# Patient Record
Sex: FEMALE | Race: BLACK OR AFRICAN AMERICAN | NOT HISPANIC OR LATINO | URBAN - METROPOLITAN AREA
[De-identification: names, ages, dates, MRNs, and addresses within clinical notes are randomized per-mention and may not be internally consistent; named-entity substitution may affect disease eponyms.]

---

## 2021-06-10 ENCOUNTER — OUT OF OFFICE VISIT (OUTPATIENT)
Dept: URBAN - METROPOLITAN AREA MEDICAL CENTER 28 | Facility: MEDICAL CENTER | Age: 41
End: 2021-06-10
Payer: OTHER GOVERNMENT

## 2021-06-10 DIAGNOSIS — K52.89 (LYMPHOCYTIC) MICROSCOPIC COLITIS: ICD-10-CM

## 2021-06-10 DIAGNOSIS — K76.89 ABNORMAL FINDING ON LIVER FUNCTION: ICD-10-CM

## 2021-06-10 PROCEDURE — 99232 SBSQ HOSP IP/OBS MODERATE 35: CPT | Performed by: INTERNAL MEDICINE

## 2021-06-16 ENCOUNTER — TELEPHONE ENCOUNTER (OUTPATIENT)
Dept: URBAN - METROPOLITAN AREA CLINIC 23 | Facility: CLINIC | Age: 41
End: 2021-06-16

## 2021-06-16 ENCOUNTER — OFFICE VISIT (OUTPATIENT)
Dept: URBAN - METROPOLITAN AREA CLINIC 78 | Facility: CLINIC | Age: 41
End: 2021-06-16
Payer: OTHER GOVERNMENT

## 2021-06-16 VITALS
BODY MASS INDEX: 21.63 KG/M2 | TEMPERATURE: 98 F | HEART RATE: 99 BPM | WEIGHT: 129.8 LBS | SYSTOLIC BLOOD PRESSURE: 132 MMHG | DIASTOLIC BLOOD PRESSURE: 94 MMHG | HEIGHT: 65 IN

## 2021-06-16 DIAGNOSIS — K52.89 OTHER SPECIFIED NONINFECTIVE GASTROENTERITIS AND COLITIS: ICD-10-CM

## 2021-06-16 DIAGNOSIS — K59.09 CHRONIC CONSTIPATION: ICD-10-CM

## 2021-06-16 DIAGNOSIS — K58.1 IRRITABLE BOWEL SYNDROME WITH CONSTIPATION: ICD-10-CM

## 2021-06-16 PROCEDURE — G9903 PT SCRN TBCO ID AS NON USER: HCPCS | Performed by: INTERNAL MEDICINE

## 2021-06-16 PROCEDURE — 1036F TOBACCO NON-USER: CPT | Performed by: INTERNAL MEDICINE

## 2021-06-16 PROCEDURE — G8420 CALC BMI NORM PARAMETERS: HCPCS | Performed by: INTERNAL MEDICINE

## 2021-06-16 PROCEDURE — G9622 NO UNHEAL ETOH USER: HCPCS | Performed by: INTERNAL MEDICINE

## 2021-06-16 PROCEDURE — 3017F COLORECTAL CA SCREEN DOC REV: CPT | Performed by: INTERNAL MEDICINE

## 2021-06-16 PROCEDURE — G8427 DOCREV CUR MEDS BY ELIG CLIN: HCPCS | Performed by: INTERNAL MEDICINE

## 2021-06-16 PROCEDURE — 99214 OFFICE O/P EST MOD 30 MIN: CPT | Performed by: INTERNAL MEDICINE

## 2021-06-16 RX ORDER — SODIUM, POTASSIUM,MAG SULFATES 17.5-3.13G
17.5-13.3-1.6 GM/177ML SOLUTION, RECONSTITUTED, ORAL ORAL AS DIRECTED
Qty: 354 MILLILITER | OUTPATIENT
Start: 2021-06-16 | End: 2021-06-17

## 2021-06-16 RX ORDER — PROMETHAZINE HYDROCHLORIDE 12.5 MG/1
1 TABLET AS NEEDED TABLET ORAL
Status: ACTIVE | COMMUNITY

## 2021-06-16 RX ORDER — DICYCLOMINE HYDROCHLORIDE 10 MG/1
TAKE 1 CAPSULE (10 MG) BY ORAL ROUTE 3 TIMES PER DAY AS NEEDED CAPSULE ORAL
Qty: 90 | Refills: 11 | Status: ACTIVE | COMMUNITY
Start: 2018-01-30

## 2021-06-16 RX ORDER — DICYCLOMINE HYDROCHLORIDE 20 MG/1
1 TABLET TABLET ORAL THREE TIMES A DAY
Qty: 90 | Refills: 1 | OUTPATIENT
Start: 2021-06-16 | End: 2021-08-15

## 2021-06-16 NOTE — PREVIOUS WORKUP REVIEWED
.IMAGES -MRI liver without and with contrast and MRCP 6/9/2021: Focal nodular hyperplasia x2. Recommend follow-up MRI in 6 months with Eovist.-CT abdomen and pelvis with contrast 6/7/2021: Possible descending and sigmoid colitis. 2 x 2 cm liver lesion in the segment 2, hypervascular. Another 3 mm hypervascular lesion in the segment 2.-CT abdomen pelvis with IV contrast 4/3/2019: Stable 2 cm liver lesion, hyperenhancement in the left hepatic lobe. Mild periportal edema. Mild gallbladder wall thickening without stones. Normal pancreas. Normal spleen. Mild thickening of the gastric wall. Under distended colon with some mild wall thickening. Distal colonic diverticulosis.-MRI abdomen with and without contrast 1/23/2018: Benign-appearing area for focal nodular hyperplasia within segment 2. Additional probable small area of focal nodular hyperplasia within the left hepatic lobe.-CT abdomen pelvis with contrast 1/8/2018: Equivocal mild thickening of the colon and possibility of mild colitis. Mild gallbladder wall thickening and periportal edema. 2 cm enhancing lesion in the left lobe of the liver. Possible 1 cm lesion in the right lobe of the liver. LABS -Labs 6/7/2021: WBC 14.5, hemoglobin 14.3, platelet 227. Sodium 140, potassium 3.8, BUN 17, creatinine 0.8, total protein 8.0, albumin 4.3, total bilirubin 1.1, alkaline phosphatase 79, AST 19, ALT 13. Lipase 73.-Labs 6/10/2021: WBC 7.9, hemoglobin 14.9, platelets 235. BUN 8, creatinine 0.8. GPP negative, C. difficile negative.-Labs 4/3/2019: BUN 13, creatinine 0.69, total protein 8.3, albumin 4.8, total bilirubin 1.4, alkaline phosphatase 69, ALT 11, AST 15, lipase 23, WBC 11, hemoglobin 13.7, platelet 213.-Labs 1/18/2018: Total bilirubin 0.6, direct bilirubin 0.1, alkaline phosphatase 75, AST 15, ALT 10, no evidence of hemolysis.

## 2021-06-16 NOTE — HPI-TODAY'S VISIT:
41-year-old -American female with chronic constipation on Linzess 145 mcg, presents for follow-up of hospital stay for possible colitis.  She had acute onset nausea vomiting migrating abdominal pain and diarrhea about 2 weeks ago.  She went to the hospital and hospitalized for few days.  CT scan showed possible colitis in the left-sided colon.  She reports that she had worsening constipation before this happened.  She would move bowel every other day with Linzess 145 mcg.  She had colonoscopy done 5 years ago.  No family history of colon cancer or IBD.  Currently she feels better, no nausea or vomiting.  Residual abdominal pain.  No bowel movements for 3-4 days then yesterday she had 5 bowel movements, loose stools.  She took antibiotic takes Levaquin and Flagyl finished 3 days ago.  Antibiotic helped a lot.

## 2021-06-18 ENCOUNTER — OFFICE VISIT (OUTPATIENT)
Dept: URBAN - METROPOLITAN AREA SURGERY CENTER 15 | Facility: SURGERY CENTER | Age: 41
End: 2021-06-18
Payer: OTHER GOVERNMENT

## 2021-06-18 DIAGNOSIS — K63.89 BACTERIAL OVERGROWTH SYNDROME: ICD-10-CM

## 2021-06-18 DIAGNOSIS — D12.5 ADENOMA OF SIGMOID COLON: ICD-10-CM

## 2021-06-18 DIAGNOSIS — R93.3 ABN FINDINGS-GI TRACT: ICD-10-CM

## 2021-06-18 PROCEDURE — G8907 PT DOC NO EVENTS ON DISCHARG: HCPCS | Performed by: INTERNAL MEDICINE

## 2021-06-18 PROCEDURE — 45385 COLONOSCOPY W/LESION REMOVAL: CPT | Performed by: INTERNAL MEDICINE

## 2021-06-18 PROCEDURE — 45380 COLONOSCOPY AND BIOPSY: CPT | Performed by: INTERNAL MEDICINE

## 2021-06-18 RX ORDER — DICYCLOMINE HYDROCHLORIDE 10 MG/1
TAKE 1 CAPSULE (10 MG) BY ORAL ROUTE 3 TIMES PER DAY AS NEEDED CAPSULE ORAL
Qty: 90 | Refills: 11 | Status: ACTIVE | COMMUNITY
Start: 2018-01-30

## 2021-06-18 RX ORDER — PROMETHAZINE HYDROCHLORIDE 12.5 MG/1
1 TABLET AS NEEDED TABLET ORAL
Status: ACTIVE | COMMUNITY

## 2021-06-18 RX ORDER — DICYCLOMINE HYDROCHLORIDE 20 MG/1
1 TABLET TABLET ORAL THREE TIMES A DAY
Qty: 90 | Refills: 1 | Status: ACTIVE | COMMUNITY
Start: 2021-06-16 | End: 2021-08-15

## 2021-07-29 ENCOUNTER — OFFICE VISIT (OUTPATIENT)
Dept: URBAN - METROPOLITAN AREA SURGERY CENTER 15 | Facility: SURGERY CENTER | Age: 41
End: 2021-07-29

## 2021-08-03 ENCOUNTER — OFFICE VISIT (OUTPATIENT)
Dept: URBAN - METROPOLITAN AREA CLINIC 78 | Facility: CLINIC | Age: 41
End: 2021-08-03

## 2021-12-09 ENCOUNTER — OFFICE VISIT (OUTPATIENT)
Dept: URBAN - METROPOLITAN AREA CLINIC 78 | Facility: CLINIC | Age: 41
End: 2021-12-09

## 2021-12-09 RX ORDER — DICYCLOMINE HYDROCHLORIDE 10 MG/1
TAKE 1 CAPSULE (10 MG) BY ORAL ROUTE 3 TIMES PER DAY AS NEEDED CAPSULE ORAL
Qty: 90 | Refills: 11 | Status: ACTIVE | COMMUNITY
Start: 2018-01-30

## 2021-12-09 RX ORDER — PROMETHAZINE HYDROCHLORIDE 12.5 MG/1
1 TABLET AS NEEDED TABLET ORAL
Status: ACTIVE | COMMUNITY

## 2022-01-27 ENCOUNTER — OFFICE VISIT (OUTPATIENT)
Dept: URBAN - METROPOLITAN AREA CLINIC 78 | Facility: CLINIC | Age: 42
End: 2022-01-27
Payer: OTHER GOVERNMENT

## 2022-01-27 VITALS
HEART RATE: 106 BPM | BODY MASS INDEX: 22.89 KG/M2 | HEIGHT: 65 IN | WEIGHT: 137.4 LBS | SYSTOLIC BLOOD PRESSURE: 127 MMHG | TEMPERATURE: 98.1 F | DIASTOLIC BLOOD PRESSURE: 86 MMHG

## 2022-01-27 DIAGNOSIS — K58.1 IRRITABLE BOWEL SYNDROME WITH CONSTIPATION: ICD-10-CM

## 2022-01-27 DIAGNOSIS — K76.89 FOCAL NODULAR HYPERPLASIA OF LIVER: ICD-10-CM

## 2022-01-27 PROBLEM — 440630006: Status: ACTIVE | Noted: 2021-06-16

## 2022-01-27 PROBLEM — 278527001: Status: ACTIVE | Noted: 2022-01-27

## 2022-01-27 PROCEDURE — G9622 NO UNHEAL ETOH USER: HCPCS | Performed by: INTERNAL MEDICINE

## 2022-01-27 PROCEDURE — G9903 PT SCRN TBCO ID AS NON USER: HCPCS | Performed by: INTERNAL MEDICINE

## 2022-01-27 PROCEDURE — G8420 CALC BMI NORM PARAMETERS: HCPCS | Performed by: INTERNAL MEDICINE

## 2022-01-27 PROCEDURE — 3017F COLORECTAL CA SCREEN DOC REV: CPT | Performed by: INTERNAL MEDICINE

## 2022-01-27 PROCEDURE — 99214 OFFICE O/P EST MOD 30 MIN: CPT | Performed by: INTERNAL MEDICINE

## 2022-01-27 PROCEDURE — 1036F TOBACCO NON-USER: CPT | Performed by: INTERNAL MEDICINE

## 2022-01-27 PROCEDURE — G8427 DOCREV CUR MEDS BY ELIG CLIN: HCPCS | Performed by: INTERNAL MEDICINE

## 2022-01-27 RX ORDER — LINACLOTIDE 145 UG/1
1 CAPSULE AT LEAST 30 MINUTES BEFORE THE FIRST MEAL OF THE DAY ON AN EMPTY STOMACH CAPSULE, GELATIN COATED ORAL ONCE A DAY
Qty: 90 | Refills: 3 | OUTPATIENT
Start: 2022-01-27 | End: 2023-01-22

## 2022-01-27 RX ORDER — PROMETHAZINE HYDROCHLORIDE 12.5 MG/1
1 TABLET AS NEEDED TABLET ORAL
Status: ACTIVE | COMMUNITY

## 2022-01-27 RX ORDER — DICYCLOMINE HYDROCHLORIDE 10 MG/1
TAKE 1 CAPSULE (10 MG) BY ORAL ROUTE 3 TIMES PER DAY AS NEEDED CAPSULE ORAL
Qty: 90 | Refills: 11 | Status: ACTIVE | COMMUNITY
Start: 2018-01-30

## 2022-01-27 NOTE — HPI-TODAY'S VISIT:
41-year-old -American female presents for follow-up of IBS-C.  She takes Linzess 145 mcg daily.  Align probiotics.  She moves bowel daily or every other day.  When she has no bowel movement for >2 days, she starts having abdominal pain, cramping, nausea. She tried Linzess 290 mcg in the past, caused the diarrhea.

## 2022-01-27 NOTE — PREVIOUS WORKUP REVIEWED
REVIEWED EXTERNAL MEDICAL RECORD  ENDOSCOPIES  -Colonoscopy 6/18/2021: Normal TI.  Moderately redundant colon.  A 7 mm polyp in the sigmoid colon.  Hemorrhoids. *Pathology: Random colon-normal, several lymphoid aggregates.  Sigmoid colon polyp-serrated adenoma. IMAGES  -MRI liver without and with contrast and MRCP 6/9/2021: Focal nodular hyperplasia x2. 1.8cm and 0.8cm.-CT abdomen and pelvis with contrast 6/7/2021: Possible descending and sigmoid colitis. 2 x 2 cm liver lesion in the segment 2, hypervascular. Another 3 mm hypervascular lesion in the segment 2.-CT abdomen pelvis with IV contrast 4/3/2019: Stable 2 cm liver lesion, hyperenhancement in the left hepatic lobe. Mild periportal edema. Mild gallbladder wall thickening without stones. Normal pancreas. Normal spleen. Mild thickening of the gastric wall. Under distended colon with some mild wall thickening. Distal colonic diverticulosis.-MRI abdomen with and without contrast 1/23/2018: Benign-appearing area for focal nodular hyperplasia within segment 2. Additional probable small area of focal nodular hyperplasia within the left hepatic lobe. (1.6 cm, 0.5 cm)-CT abdomen pelvis with contrast 1/8/2018: Equivocal mild thickening of the colon and possibility of mild colitis. Mild gallbladder wall thickening and periportal edema. 2 cm enhancing lesion in the left lobe of the liver. Possible 1 cm lesion in the right lobe of the liver. LABS -Labs 6/7/2021: WBC 14.5, hemoglobin 14.3, platelet 227. Sodium 140, potassium 3.8, BUN 17, creatinine 0.8, total protein 8.0, albumin 4.3, total bilirubin 1.1, alkaline phosphatase 79, AST 19, ALT 13. Lipase 73.-Labs 6/10/2021: WBC 7.9, hemoglobin 14.9, platelets 235. BUN 8, creatinine 0.8. GPP negative, C. difficile negative.-Labs 4/3/2019: BUN 13, creatinine 0.69, total protein 8.3, albumin 4.8, total bilirubin 1.4, alkaline phosphatase 69, ALT 11, AST 15, lipase 23, WBC 11, hemoglobin 13.7, platelet 213.-Labs 1/18/2018: Total bilirubin 0.6, direct bilirubin 0.1, alkaline phosphatase 75, AST 15, ALT 10, no evidence of hemolysis.

## 2022-06-09 ENCOUNTER — OFFICE VISIT (OUTPATIENT)
Dept: URBAN - METROPOLITAN AREA CLINIC 78 | Facility: CLINIC | Age: 42
End: 2022-06-09
Payer: OTHER GOVERNMENT

## 2022-06-09 ENCOUNTER — LAB OUTSIDE AN ENCOUNTER (OUTPATIENT)
Dept: URBAN - METROPOLITAN AREA CLINIC 78 | Facility: CLINIC | Age: 42
End: 2022-06-09

## 2022-06-09 DIAGNOSIS — K58.9 IRRITABLE BOWEL SYNDROME, UNSPECIFIED TYPE: ICD-10-CM

## 2022-06-09 DIAGNOSIS — K82.8 GALLBLADDER SLUDGE: ICD-10-CM

## 2022-06-09 DIAGNOSIS — R11.2 NAUSEA AND VOMITING, UNSPECIFIED VOMITING TYPE: ICD-10-CM

## 2022-06-09 DIAGNOSIS — R10.84 ABDOMINAL CRAMPING, GENERALIZED: ICD-10-CM

## 2022-06-09 PROCEDURE — G9622 NO UNHEAL ETOH USER: HCPCS | Performed by: INTERNAL MEDICINE

## 2022-06-09 PROCEDURE — 1036F TOBACCO NON-USER: CPT | Performed by: INTERNAL MEDICINE

## 2022-06-09 PROCEDURE — G8420 CALC BMI NORM PARAMETERS: HCPCS | Performed by: INTERNAL MEDICINE

## 2022-06-09 PROCEDURE — G9903 PT SCRN TBCO ID AS NON USER: HCPCS | Performed by: INTERNAL MEDICINE

## 2022-06-09 PROCEDURE — G8427 DOCREV CUR MEDS BY ELIG CLIN: HCPCS | Performed by: INTERNAL MEDICINE

## 2022-06-09 PROCEDURE — 3017F COLORECTAL CA SCREEN DOC REV: CPT | Performed by: INTERNAL MEDICINE

## 2022-06-09 PROCEDURE — 99214 OFFICE O/P EST MOD 30 MIN: CPT | Performed by: INTERNAL MEDICINE

## 2022-06-09 RX ORDER — AMITRIPTYLINE HYDROCHLORIDE 10 MG/1
1 TABLET AT BEDTIME TABLET, FILM COATED ORAL ONCE A DAY
Qty: 30 | Refills: 2 | OUTPATIENT
Start: 2022-06-09

## 2022-06-09 RX ORDER — DICYCLOMINE HYDROCHLORIDE 10 MG/1
TAKE 1 CAPSULE (10 MG) BY ORAL ROUTE 3 TIMES PER DAY AS NEEDED CAPSULE ORAL
Qty: 90 | Refills: 11 | Status: ACTIVE | COMMUNITY
Start: 2018-01-30

## 2022-06-09 RX ORDER — LINACLOTIDE 145 UG/1
1 CAPSULE AT LEAST 30 MINUTES BEFORE THE FIRST MEAL OF THE DAY ON AN EMPTY STOMACH CAPSULE, GELATIN COATED ORAL ONCE A DAY
Qty: 90 | Refills: 3 | Status: ACTIVE | COMMUNITY
Start: 2022-01-27 | End: 2023-01-22

## 2022-06-09 NOTE — PREVIOUS WORKUP REVIEWED
REVIEWED EXTERNAL MEDICAL RECORDENDOSCOPIES-Colonoscopy 6/18/2021: Normal TI. Moderately redundant colon. A 7 mm polyp in the sigmoid colon. Hemorrhoids.*Pathology: Random colon-normal, several lymphoid aggregates. Sigmoid colon polyp-serrated adenoma.IMAGES -CT abdomen pelvis with contrast 4/7/2022:Mild colonic wall thickening, equivocal. Focal nodular hyperplasia in the liver, 2.4 cm.-MRI liver without and with contrast and MRCP 6/9/2021: Focal nodular hyperplasia x2. 1.8cm and 0.8cm.-CT abdomen and pelvis with contrast 6/7/2021: Possible descending and sigmoid colitis. 2 x 2 cm liver lesion in the segment 2, hypervascular. Another 3 mm hypervascular lesion in the segment 2.-CT abdomen pelvis with IV contrast 4/3/2019: Stable 2 cm liver lesion, hyperenhancement in the left hepatic lobe. Mild periportal edema. Mild gallbladder wall thickening without stones. Normal pancreas. Normal spleen. Mild thickening of the gastric wall. Under distended colon with some mild wall thickening. Distal colonic diverticulosis.-MRI abdomen with and without contrast 1/23/2018: Benign-appearing area for focal nodular hyperplasia within segment 2. Additional probable small area of focal nodular hyperplasia within the left hepatic lobe. (1.6 cm, 0.5 cm)-CT abdomen pelvis with contrast 1/8/2018: Equivocal mild thickening of the colon and possibility of mild colitis. Mild gallbladder wall thickening and periportal edema. 2 cm enhancing lesion in the left lobe of the liver. Possible 1 cm lesion in the right lobe of the liver. LABS -Labs 4/10/2022:WBC 7.8, hemoglobin 17.6, platelet 246. Sodium 133, potassium 3.2, glucose 111, BUN 20, creatinine 0.8, calcium 9.5, total protein 7.6, albumin 4.1, total bilirubin 2.0 H, alkaline phosphatase 58, AST 16, ALT 14, lipase 19.-Labs 4/7/2022:WBC 11.7, hemoglobin 15.9, platelet 239. BUN 23, creatinine 0.8, total bilirubin 1.0, alkaline phosphatase 60, AST 12, ALT 14, lipase 29.-Labs 6/7/2021: WBC 14.5, hemoglobin 14.3, platelet 227. Sodium 140, potassium 3.8, BUN 17, creatinine 0.8, total protein 8.0, albumin 4.3, total bilirubin 1.1, alkaline phosphatase 79, AST 19, ALT 13. Lipase 73.-Labs 6/10/2021: WBC 7.9, hemoglobin 14.9, platelets 235. BUN 8, creatinine 0.8. GPP negative, C. difficile negative.-Labs 4/3/2019: BUN 13, creatinine 0.69, total protein 8.3, albumin 4.8, total bilirubin 1.4, alkaline phosphatase 69, ALT 11, AST 15, lipase 23, WBC 11, hemoglobin 13.7, platelet 213.-Labs 1/18/2018: Total bilirubin 0.6, direct bilirubin 0.1, alkaline phosphatase 75, AST 15, ALT 10, no evidence of hemolysis. , -

## 2022-06-09 NOTE — HPI-TODAY'S VISIT:
42-year-old female with IBS-C presents for follow-up of multiple ER visits for nausea vomiting dehydration abdominal pain.  Abdomen pain is left-sided and lower abdomen.  Intermittent.  Postprandial.  Denies blood in stool.  Weight loss of 5-8 pounds.  ER visit x3, [facility] x2 and [facility].  Unremarkable labs and CT scan.  Currently she is feeling better but she gets symptomatic once a week.  She is on pantoprazole, prescribed in the ER.  EGD long time ago unremarkable.  She takes dicyclomine as needed, almost every day. IBS-C, she takes Linzess 145 mcg.  Moves bowel daily, [place] Stool Scale 7.  Without the medication she would not move bowel at all. She is following OB/GYN for abdominal pain.  Painful menstruation.  Possibility of endometriosis was considered but less likely per patient's report.

## 2022-06-09 NOTE — PHYSICAL EXAM CONSTITUTIONAL:
well developed, well nourished , in no acute distress , ambulating without difficulty , normal communication ability , -

## 2022-07-06 ENCOUNTER — TELEPHONE ENCOUNTER (OUTPATIENT)
Dept: URBAN - METROPOLITAN AREA CLINIC 78 | Facility: CLINIC | Age: 42
End: 2022-07-06

## 2022-07-12 ENCOUNTER — OFFICE VISIT (OUTPATIENT)
Dept: URBAN - METROPOLITAN AREA CLINIC 78 | Facility: CLINIC | Age: 42
End: 2022-07-12
Payer: OTHER GOVERNMENT

## 2022-07-12 VITALS
SYSTOLIC BLOOD PRESSURE: 177 MMHG | WEIGHT: 129.8 LBS | HEIGHT: 65 IN | TEMPERATURE: 98 F | HEART RATE: 78 BPM | DIASTOLIC BLOOD PRESSURE: 125 MMHG | BODY MASS INDEX: 21.63 KG/M2

## 2022-07-12 DIAGNOSIS — R11.2 NAUSEA AND VOMITING, UNSPECIFIED VOMITING TYPE: ICD-10-CM

## 2022-07-12 DIAGNOSIS — K58.9 IRRITABLE BOWEL SYNDROME, UNSPECIFIED TYPE: ICD-10-CM

## 2022-07-12 PROBLEM — 10743008: Status: ACTIVE | Noted: 2022-06-09

## 2022-07-12 PROCEDURE — 99213 OFFICE O/P EST LOW 20 MIN: CPT | Performed by: INTERNAL MEDICINE

## 2022-07-12 RX ORDER — LINACLOTIDE 145 UG/1
1 CAPSULE AT LEAST 30 MINUTES BEFORE THE FIRST MEAL OF THE DAY ON AN EMPTY STOMACH CAPSULE, GELATIN COATED ORAL ONCE A DAY
Qty: 90 | Refills: 3 | Status: ACTIVE | COMMUNITY
Start: 2022-01-27 | End: 2023-01-22

## 2022-07-12 RX ORDER — DICYCLOMINE HYDROCHLORIDE 10 MG/1
TAKE 1 CAPSULE (10 MG) BY ORAL ROUTE 3 TIMES PER DAY AS NEEDED CAPSULE ORAL
Qty: 90 | Refills: 11 | Status: ACTIVE | COMMUNITY
Start: 2018-01-30

## 2022-07-12 RX ORDER — AMITRIPTYLINE HYDROCHLORIDE 10 MG/1
1 TABLET AT BEDTIME TABLET, FILM COATED ORAL ONCE A DAY
Qty: 30 | Refills: 2 | Status: ACTIVE | COMMUNITY
Start: 2022-06-09

## 2022-07-12 RX ORDER — NORETHINDRONE ACETATE 5 MG/1
TABLET ORAL
Qty: 60 TABLET | Status: ACTIVE | COMMUNITY

## 2022-07-12 RX ORDER — PANTOPRAZOLE SODIUM 40 MG/1
1 TABLET TABLET, DELAYED RELEASE ORAL ONCE A DAY
Qty: 90 TABLET | Refills: 3

## 2022-07-12 RX ORDER — ONDANSETRON 4 MG/1
TABLET, ORALLY DISINTEGRATING ORAL
Qty: 12 EACH | Status: ACTIVE | COMMUNITY

## 2022-07-12 NOTE — PREVIOUS WORKUP REVIEWED
REVIEWED EXTERNAL MEDICAL RECORDENDOSCOPIES-Colonoscopy 6/18/2021: Normal TI. Moderately redundant colon. A 7 mm polyp in the sigmoid colon. Hemorrhoids.*Pathology: Random colon-normal, several lymphoid aggregates. Sigmoid colon polyp-serrated adenoma.IMAGES-HIDA scan with CCK 6/30/2022: Normal.  Ejection fraction 87 %.-RUQ US 4/11/2022: No gallstone.  CBD < 6 mm.  Visualized portion of pancreas normal.  Normal liver.  No focal liver lesion.-CT abdomen pelvis with contrast 4/7/2022:Mild colonic wall thickening, equivocal. Focal nodular hyperplasia in the liver, 2.4 cm.-MRI liver without and with contrast and MRCP 6/9/2021: Focal nodular hyperplasia x2. 1.8cm and 0.8cm.-CT abdomen and pelvis with contrast 6/7/2021: Possible descending and sigmoid colitis. 2 x 2 cm liver lesion in the segment 2, hypervascular. Another 3 mm hypervascular lesion in the segment 2.-CT abdomen pelvis with IV contrast 4/3/2019: Stable 2 cm liver lesion, hyperenhancement in the left hepatic lobe. Mild periportal edema. Mild gallbladder wall thickening without stones. Normal pancreas. Normal spleen. Mild thickening of the gastric wall. Under distended colon with some mild wall thickening. Distal colonic diverticulosis.-MRI abdomen with and without contrast 1/23/2018: Benign-appearing area for focal nodular hyperplasia within segment 2. Additional probable small area of focal nodular hyperplasia within the left hepatic lobe. (1.6 cm, 0.5 cm)-CT abdomen pelvis with contrast 1/8/2018: Equivocal mild thickening of the colon and possibility of mild colitis. Mild gallbladder wall thickening and periportal edema. 2 cm enhancing lesion in the left lobe of the liver. Possible 1 cm lesion in the right lobe of the liver. LABS -Labs 4/10/2022:WBC 7.8, hemoglobin 17.6, platelet 246. Sodium 133, potassium 3.2, glucose 111, BUN 20, creatinine 0.8, calcium 9.5, total protein 7.6, albumin 4.1, total bilirubin 2.0 H, alkaline phosphatase 58, AST 16, ALT 14, lipase 19.-Labs 4/7/2022:WBC 11.7, hemoglobin 15.9, platelet 239. BUN 23, creatinine 0.8, total bilirubin 1.0, alkaline phosphatase 60, AST 12, ALT 14, lipase 29.-Labs 6/7/2021: WBC 14.5, hemoglobin 14.3, platelet 227. Sodium 140, potassium 3.8, BUN 17, creatinine 0.8, total protein 8.0, albumin 4.3, total bilirubin 1.1, alkaline phosphatase 79, AST 19, ALT 13. Lipase 73.-Labs 6/10/2021: WBC 7.9, hemoglobin 14.9, platelets 235. BUN 8, creatinine 0.8. GPP negative, C. difficile negative.-Labs 4/3/2019: BUN 13, creatinine 0.69, total protein 8.3, albumin 4.8, total bilirubin 1.4, alkaline phosphatase 69, ALT 11, AST 15, lipase 23, WBC 11, hemoglobin 13.7, platelet 213.-Labs 1/18/2018: Total bilirubin 0.6, direct bilirubin 0.1, alkaline phosphatase 75, AST 15, ALT 10, no evidence of hemolysis. , -

## 2022-07-12 NOTE — HPI-TODAY'S VISIT:
42-year-old female with IBS, chronic left lower quadrant pain presents for follow-up.  Last visit a month ago, amitriptyline 10 mg was prescribed.  HIDA scan with CCK ordered and came back negative.  The medication helps a lot.  No abdominal pain anymore.  Moves bowel better with it.  She forgot to take pantoprazole for the past 2 weeks.  She had multiple vomitings a few days ago.

## 2022-07-22 ENCOUNTER — TELEPHONE ENCOUNTER (OUTPATIENT)
Dept: URBAN - METROPOLITAN AREA CLINIC 78 | Facility: CLINIC | Age: 42
End: 2022-07-22

## 2022-08-05 ENCOUNTER — TELEPHONE ENCOUNTER (OUTPATIENT)
Dept: URBAN - METROPOLITAN AREA CLINIC 77 | Facility: CLINIC | Age: 42
End: 2022-08-05

## 2022-08-05 RX ORDER — AMITRIPTYLINE HYDROCHLORIDE 25 MG/1
1 TABLET AT BEDTIME TABLET, FILM COATED ORAL ONCE A DAY
Qty: 90 TABLET | Refills: 3
Start: 2022-06-09

## 2022-08-25 ENCOUNTER — OFFICE VISIT (OUTPATIENT)
Dept: URBAN - METROPOLITAN AREA CLINIC 78 | Facility: CLINIC | Age: 42
End: 2022-08-25

## 2023-01-25 ENCOUNTER — OFFICE VISIT (OUTPATIENT)
Dept: URBAN - METROPOLITAN AREA CLINIC 78 | Facility: CLINIC | Age: 43
End: 2023-01-25

## 2023-08-10 ENCOUNTER — TELEPHONE ENCOUNTER (OUTPATIENT)
Dept: URBAN - METROPOLITAN AREA CLINIC 113 | Facility: CLINIC | Age: 43
End: 2023-08-10

## 2023-08-10 RX ORDER — PANTOPRAZOLE SODIUM 40 MG/1
1 TABLET TABLET, DELAYED RELEASE ORAL ONCE A DAY
Qty: 90 TABLET | Refills: 3

## 2023-09-13 ENCOUNTER — OFFICE VISIT (OUTPATIENT)
Dept: URBAN - METROPOLITAN AREA CLINIC 78 | Facility: CLINIC | Age: 43
End: 2023-09-13
Payer: OTHER GOVERNMENT

## 2023-09-13 ENCOUNTER — DASHBOARD ENCOUNTERS (OUTPATIENT)
Age: 43
End: 2023-09-13

## 2023-09-13 VITALS
HEIGHT: 65 IN | WEIGHT: 163 LBS | HEART RATE: 80 BPM | SYSTOLIC BLOOD PRESSURE: 131 MMHG | BODY MASS INDEX: 27.16 KG/M2 | TEMPERATURE: 98.1 F | DIASTOLIC BLOOD PRESSURE: 85 MMHG

## 2023-09-13 DIAGNOSIS — R10.30 LOWER ABDOMINAL PAIN: ICD-10-CM

## 2023-09-13 DIAGNOSIS — K58.1 IRRITABLE BOWEL SYNDROME WITH CONSTIPATION: ICD-10-CM

## 2023-09-13 DIAGNOSIS — K21.9 CHRONIC GERD: ICD-10-CM

## 2023-09-13 PROBLEM — 235595009: Status: ACTIVE | Noted: 2023-09-13

## 2023-09-13 PROCEDURE — 99214 OFFICE O/P EST MOD 30 MIN: CPT | Performed by: INTERNAL MEDICINE

## 2023-09-13 RX ORDER — PANTOPRAZOLE SODIUM 40 MG/1
1 TABLET TABLET, DELAYED RELEASE ORAL ONCE A DAY
Qty: 90 TABLET | Refills: 3

## 2023-09-13 RX ORDER — ONDANSETRON 4 MG/1
TABLET, ORALLY DISINTEGRATING ORAL
Qty: 12 EACH | Status: ACTIVE | COMMUNITY

## 2023-09-13 RX ORDER — AMITRIPTYLINE HYDROCHLORIDE 25 MG/1
1 TABLET AT BEDTIME TABLET, FILM COATED ORAL ONCE A DAY
Qty: 90 TABLET | Refills: 3
Start: 2022-06-09

## 2023-09-13 RX ORDER — DICYCLOMINE HYDROCHLORIDE 10 MG/1
TAKE 1 CAPSULE (10 MG) BY ORAL ROUTE 3 TIMES PER DAY AS NEEDED CAPSULE ORAL
Qty: 90 | Refills: 11 | Status: ACTIVE | COMMUNITY
Start: 2018-01-30

## 2023-09-13 RX ORDER — NORETHINDRONE ACETATE 5 MG/1
TABLET ORAL
Qty: 60 TABLET | Status: ACTIVE | COMMUNITY

## 2023-09-13 RX ORDER — DICYCLOMINE HYDROCHLORIDE 20 MG/1
1 TABLET TABLET ORAL
Qty: 90 | Refills: 3 | OUTPATIENT
Start: 2023-09-13 | End: 2024-01-10

## 2023-09-13 RX ORDER — PANTOPRAZOLE SODIUM 40 MG/1
1 TABLET TABLET, DELAYED RELEASE ORAL ONCE A DAY
Qty: 90 TABLET | Refills: 3 | Status: ACTIVE | COMMUNITY

## 2023-09-13 RX ORDER — AMITRIPTYLINE HYDROCHLORIDE 25 MG/1
1 TABLET AT BEDTIME TABLET, FILM COATED ORAL ONCE A DAY
Qty: 90 TABLET | Refills: 3 | Status: ACTIVE | COMMUNITY
Start: 2022-06-09

## 2023-09-13 NOTE — HPI-TODAY'S VISIT:
43-year-old female presents for 4 months history of flareup of IBS.  Constipated. She moves bowel 3-4 times a week.  [place] Stool Scale type I-II.  Sharp pain in the lower abdomen.  Short burst. Amitriptyline helped with her IBS symptoms.  She ran out of the medication requesting refill. She also requesting pantoprazole for her chronic GERD. Currently she is not on laxatives. She is asking for FMLA.

## 2023-09-13 NOTE — PREVIOUS WORKUP REVIEWED
REVIEWED EXTERNAL MEDICAL RECORDENDOSCOPIES-Colonoscopy 6/18/2021: Normal TI. Moderately redundant colon. A 7 mm polyp in the sigmoid colon. Hemorrhoids.*Pathology: Random colon-normal, several lymphoid aggregates. Sigmoid colon polyp-serrated adenoma.IMAGES-HIDA scan with CCK 6/30/2022: Normal.  Ejection fraction 87 %.-RUQ US 4/11/2022: No gallstone.  CBD < 6 mm.  Visualized portion of pancreas normal.  Normal liver.  No focal liver lesion.-CT abdomen pelvis with contrast 4/7/2022:Mild colonic wall thickening, equivocal. Focal nodular hyperplasia in the liver, 2.4 cm.-MRI liver without and with contrast and MRCP 6/9/2021: Focal nodular hyperplasia x2. 1.8cm and 0.8cm.-CT abdomen and pelvis with contrast 6/7/2021: Possible descending and sigmoid colitis. 2 x 2 cm liver lesion in the segment 2, hypervascular. Another 3 mm hypervascular lesion in the segment 2.-CT abdomen pelvis with IV contrast 4/3/2019: Stable 2 cm liver lesion, hyperenhancement in the left hepatic lobe. Mild periportal edema. Mild gallbladder wall thickening without stones. Normal pancreas. Normal spleen. Mild thickening of the gastric wall. Under distended colon with some mild wall thickening. Distal colonic diverticulosis.-MRI abdomen with and without contrast 1/23/2018: Benign-appearing area for focal nodular hyperplasia within segment 2. Additional probable small area of focal nodular hyperplasia within the left hepatic lobe. (1.6 cm, 0.5 cm)-CT abdomen pelvis with contrast 1/8/2018: Equivocal mild thickening of the colon and possibility of mild colitis. Mild gallbladder wall thickening and periportal edema. 2 cm enhancing lesion in the left lobe of the liver. Possible 1 cm lesion in the right lobe of the liver. LABS -Labs 4/10/2022:WBC 7.8, hemoglobin 17.6, platelet 246. Sodium 133, potassium 3.2, glucose 111, BUN 20, creatinine 0.8, calcium 9.5, total protein 7.6, albumin 4.1, total bilirubin 2.0 H, alkaline phosphatase 58, AST 16, ALT 14, lipase 19.-Labs 4/7/2022:WBC 11.7, hemoglobin 15.9, platelet 239. BUN 23, creatinine 0.8, total bilirubin 1.0, alkaline phosphatase 60, AST 12, ALT 14, lipase 29.-Labs 6/7/2021: WBC 14.5, hemoglobin 14.3, platelet 227. Sodium 140, potassium 3.8, BUN 17, creatinine 0.8, total protein 8.0, albumin 4.3, total bilirubin 1.1, alkaline phosphatase 79, AST 19, ALT 13. Lipase 73.-Labs 6/10/2021: WBC 7.9, hemoglobin 14.9, platelets 235. BUN 8, creatinine 0.8. GPP negative, C. difficile negative.-Labs 4/3/2019: BUN 13, creatinine 0.69, total protein 8.3, albumin 4.8, total bilirubin 1.4, alkaline phosphatase 69, ALT 11, AST 15, lipase 23, WBC 11, hemoglobin 13.7, platelet 213.-Labs 1/18/2018: Total bilirubin 0.6, direct bilirubin 0.1, alkaline phosphatase 75, AST 15, ALT 10, no evidence of hemolysis. , - , -

## 2023-09-20 ENCOUNTER — TELEPHONE ENCOUNTER (OUTPATIENT)
Dept: URBAN - METROPOLITAN AREA CLINIC 78 | Facility: CLINIC | Age: 43
End: 2023-09-20

## 2023-10-10 ENCOUNTER — LAB OUTSIDE AN ENCOUNTER (OUTPATIENT)
Dept: URBAN - METROPOLITAN AREA CLINIC 78 | Facility: CLINIC | Age: 43
End: 2023-10-10

## 2023-10-11 LAB
A/G RATIO: 1.5
ALBUMIN: 4.3
ALKALINE PHOSPHATASE: 81
ALT (SGPT): 18
AST (SGOT): 14
BILIRUBIN, TOTAL: 1.1
BUN/CREATININE RATIO: 17
BUN: 16
CALCIUM: 9.4
CARBON DIOXIDE, TOTAL: 21
CHLORIDE: 105
CREATININE: 0.93
EGFR: 79
GLOBULIN, TOTAL: 2.8
GLUCOSE: 79
HEMATOCRIT: 47
HEMOGLOBIN: 15.2
LIPASE: 36
MCH: 30.1
MCHC: 32.3
MCV: 93
NRBC: (no result)
PLATELETS: 382
POTASSIUM: 4.1
PROTEIN, TOTAL: 7.1
RBC: 5.05
RDW: 14.8
SODIUM: 138
WBC: 7.2